# Patient Record
Sex: MALE | Race: WHITE | URBAN - METROPOLITAN AREA
[De-identification: names, ages, dates, MRNs, and addresses within clinical notes are randomized per-mention and may not be internally consistent; named-entity substitution may affect disease eponyms.]

---

## 2019-06-28 ENCOUNTER — APPOINTMENT (RX ONLY)
Dept: URBAN - METROPOLITAN AREA CLINIC 31 | Facility: CLINIC | Age: 19
Setting detail: DERMATOLOGY
End: 2019-06-28

## 2019-06-28 DIAGNOSIS — L72.0 EPIDERMAL CYST: ICD-10-CM

## 2019-06-28 PROCEDURE — ? COUNSELING

## 2019-06-28 PROCEDURE — ? DEFER

## 2019-06-28 PROCEDURE — ? ADDITIONAL NOTES

## 2019-06-28 PROCEDURE — 99202 OFFICE O/P NEW SF 15 MIN: CPT

## 2019-06-28 PROCEDURE — ? PRESCRIPTION

## 2019-06-28 RX ORDER — DOXYCYCLINE HYCLATE 100 MG/1
TABLET, COATED ORAL
Qty: 20 | Refills: 0 | Status: ERX | COMMUNITY
Start: 2019-06-28

## 2019-06-28 RX ADMIN — DOXYCYCLINE HYCLATE: 100 TABLET, COATED ORAL at 16:11

## 2019-06-28 ASSESSMENT — LOCATION ZONE DERM: LOCATION ZONE: FACE

## 2019-06-28 ASSESSMENT — LOCATION DETAILED DESCRIPTION DERM: LOCATION DETAILED: LEFT CENTRAL MALAR CHEEK

## 2019-06-28 ASSESSMENT — LOCATION SIMPLE DESCRIPTION DERM: LOCATION SIMPLE: LEFT CHEEK

## 2019-06-28 NOTE — PROCEDURE: DEFER
Introduction Text (Please End With A Colon): The following procedure was deferred:
Instructions (Optional): Declines ILK
Detail Level: Simple

## 2019-06-28 NOTE — HPI: SKIN LESION
What Type Of Note Output Would You Prefer (Optional)?: Bullet Format
Has Your Skin Lesion Been Treated?: not been treated
Is This A New Presentation, Or A Follow-Up?: Skin Lesion
Which Family Member (Optional)?: Father
Which Family Member (Optional)?: Varies
Additional History: Pt admits to expressing some pus and material out of the lesion in the past

## 2019-11-14 ENCOUNTER — APPOINTMENT (RX ONLY)
Dept: URBAN - METROPOLITAN AREA CLINIC 26 | Facility: CLINIC | Age: 19
Setting detail: DERMATOLOGY
End: 2019-11-14

## 2019-11-14 DIAGNOSIS — L72.0 EPIDERMAL CYST: ICD-10-CM

## 2019-11-14 PROCEDURE — ? COUNSELING

## 2019-11-14 PROCEDURE — ? EXCISION

## 2019-11-14 PROCEDURE — 12052 INTMD RPR FACE/MM 2.6-5.0 CM: CPT | Mod: 59

## 2019-11-14 PROCEDURE — 11440 EXC FACE-MM B9+MARG 0.5 CM/<: CPT | Mod: 76

## 2019-11-14 PROCEDURE — 11440 EXC FACE-MM B9+MARG 0.5 CM/<: CPT

## 2019-11-14 ASSESSMENT — LOCATION DETAILED DESCRIPTION DERM
LOCATION DETAILED: LEFT SUPERIOR CENTRAL MALAR CHEEK
LOCATION DETAILED: LEFT CENTRAL MALAR CHEEK

## 2019-11-14 ASSESSMENT — LOCATION SIMPLE DESCRIPTION DERM: LOCATION SIMPLE: LEFT CHEEK

## 2019-11-14 ASSESSMENT — LOCATION ZONE DERM: LOCATION ZONE: FACE

## 2019-11-14 NOTE — PROCEDURE: MIPS QUALITY
Detail Level: Detailed
Quality 431: Preventive Care And Screening: Unhealthy Alcohol Use - Screening: Patient screened for unhealthy alcohol use using a single question and scores 2 or greater episodes per year and brief intervention occurred
Quality 226: Preventive Care And Screening: Tobacco Use: Screening And Cessation Intervention: Patient screened for tobacco use and is an ex/non-smoker

## 2019-11-21 ENCOUNTER — APPOINTMENT (RX ONLY)
Dept: URBAN - METROPOLITAN AREA CLINIC 26 | Facility: CLINIC | Age: 19
Setting detail: DERMATOLOGY
End: 2019-11-21

## 2019-11-21 DIAGNOSIS — Z48.817 ENCOUNTER FOR SURGICAL AFTERCARE FOLLOWING SURGERY ON THE SKIN AND SUBCUTANEOUS TISSUE: ICD-10-CM

## 2019-11-21 PROCEDURE — ? PHOTO-DOCUMENTATION

## 2019-11-21 PROCEDURE — 99024 POSTOP FOLLOW-UP VISIT: CPT

## 2019-11-21 PROCEDURE — ? POST-OP WOUND CHECK

## 2019-11-21 PROCEDURE — ? SUTURE REMOVAL (GLOBAL PERIOD)

## 2019-11-21 ASSESSMENT — LOCATION ZONE DERM: LOCATION ZONE: FACE

## 2019-11-21 ASSESSMENT — LOCATION SIMPLE DESCRIPTION DERM: LOCATION SIMPLE: LEFT CHEEK

## 2019-11-21 ASSESSMENT — LOCATION DETAILED DESCRIPTION DERM
LOCATION DETAILED: LEFT SUPERIOR MEDIAL MALAR CHEEK
LOCATION DETAILED: LEFT CENTRAL MALAR CHEEK

## 2019-11-21 NOTE — PROCEDURE: POST-OP WOUND CHECK
Add 53420 Cpt? (Important Note: In 2017 The Use Of 10495 Is Being Tracked By Cms To Determine Future Global Period Reimbursement For Global Periods): yes
Detail Level: Detailed

## 2019-11-21 NOTE — PROCEDURE: SUTURE REMOVAL (GLOBAL PERIOD)
Detail Level: Detailed
Add 70836 Cpt? (Important Note: In 2017 The Use Of 99840 Is Being Tracked By Cms To Determine Future Global Period Reimbursement For Global Periods): yes

## 2023-11-27 ENCOUNTER — HOSPITAL ENCOUNTER (EMERGENCY)
Facility: HOSPITAL | Age: 23
Discharge: HOME | End: 2023-11-27
Attending: EMERGENCY MEDICINE | Admitting: EMERGENCY MEDICINE
Payer: COMMERCIAL

## 2023-11-27 ENCOUNTER — APPOINTMENT (EMERGENCY)
Dept: CT IMAGING | Facility: HOSPITAL | Age: 23
End: 2023-11-27
Payer: COMMERCIAL

## 2023-11-27 VITALS
HEIGHT: 69 IN | WEIGHT: 200 LBS | TEMPERATURE: 97.8 F | DIASTOLIC BLOOD PRESSURE: 88 MMHG | SYSTOLIC BLOOD PRESSURE: 152 MMHG | RESPIRATION RATE: 18 BRPM | OXYGEN SATURATION: 100 % | HEART RATE: 73 BPM | BODY MASS INDEX: 29.62 KG/M2

## 2023-11-27 DIAGNOSIS — R10.9 ABDOMINAL PAIN: Primary | ICD-10-CM

## 2023-11-27 DIAGNOSIS — R59.9 LYMPH NODES ENLARGED: ICD-10-CM

## 2023-11-27 DIAGNOSIS — S39.012A STRAIN OF LUMBAR REGION, INITIAL ENCOUNTER: ICD-10-CM

## 2023-11-27 LAB
ALBUMIN SERPL BCP-MCNC: 4.4 G/DL (ref 3.5–5)
ALP SERPL-CCNC: 83 U/L (ref 34–104)
ALT SERPL W P-5'-P-CCNC: 6 U/L (ref 7–52)
ANION GAP SERPL CALCULATED.3IONS-SCNC: 7 MMOL/L
AST SERPL W P-5'-P-CCNC: 8 U/L (ref 13–39)
BASOPHILS # BLD AUTO: 0.07 THOUSANDS/ÂΜL (ref 0–0.1)
BASOPHILS NFR BLD AUTO: 1 % (ref 0–1)
BILIRUB SERPL-MCNC: 0.32 MG/DL (ref 0.2–1)
BUN SERPL-MCNC: 8 MG/DL (ref 5–25)
CALCIUM SERPL-MCNC: 9.4 MG/DL (ref 8.4–10.2)
CHLORIDE SERPL-SCNC: 104 MMOL/L (ref 96–108)
CO2 SERPL-SCNC: 25 MMOL/L (ref 21–32)
CREAT SERPL-MCNC: 0.94 MG/DL (ref 0.6–1.3)
CRP SERPL QL: 1.7 MG/L
EOSINOPHIL # BLD AUTO: 0.06 THOUSAND/ÂΜL (ref 0–0.61)
EOSINOPHIL NFR BLD AUTO: 1 % (ref 0–6)
ERYTHROCYTE [DISTWIDTH] IN BLOOD BY AUTOMATED COUNT: 12.6 % (ref 11.6–15.1)
GFR SERPL CREATININE-BSD FRML MDRD: 112 ML/MIN/1.73SQ M
GLUCOSE SERPL-MCNC: 119 MG/DL (ref 65–140)
HCT VFR BLD AUTO: 45.7 % (ref 36.5–49.3)
HGB BLD-MCNC: 14.7 G/DL (ref 12–17)
IMM GRANULOCYTES # BLD AUTO: 0.07 THOUSAND/UL (ref 0–0.2)
IMM GRANULOCYTES NFR BLD AUTO: 1 % (ref 0–2)
LIPASE SERPL-CCNC: 26 U/L (ref 11–82)
LYMPHOCYTES # BLD AUTO: 1.51 THOUSANDS/ÂΜL (ref 0.6–4.47)
LYMPHOCYTES NFR BLD AUTO: 14 % (ref 14–44)
MCH RBC QN AUTO: 29.2 PG (ref 26.8–34.3)
MCHC RBC AUTO-ENTMCNC: 32.2 G/DL (ref 31.4–37.4)
MCV RBC AUTO: 91 FL (ref 82–98)
MONOCYTES # BLD AUTO: 0.38 THOUSAND/ÂΜL (ref 0.17–1.22)
MONOCYTES NFR BLD AUTO: 4 % (ref 4–12)
NEUTROPHILS # BLD AUTO: 8.5 THOUSANDS/ÂΜL (ref 1.85–7.62)
NEUTS SEG NFR BLD AUTO: 79 % (ref 43–75)
NRBC BLD AUTO-RTO: 0 /100 WBCS
PLATELET # BLD AUTO: 349 THOUSANDS/UL (ref 149–390)
PMV BLD AUTO: 8.8 FL (ref 8.9–12.7)
POTASSIUM SERPL-SCNC: 4.3 MMOL/L (ref 3.5–5.3)
PROT SERPL-MCNC: 7.8 G/DL (ref 6.4–8.4)
RBC # BLD AUTO: 5.03 MILLION/UL (ref 3.88–5.62)
SODIUM SERPL-SCNC: 136 MMOL/L (ref 135–147)
WBC # BLD AUTO: 10.59 THOUSAND/UL (ref 4.31–10.16)

## 2023-11-27 PROCEDURE — 74177 CT ABD & PELVIS W/CONTRAST: CPT

## 2023-11-27 PROCEDURE — 96374 THER/PROPH/DIAG INJ IV PUSH: CPT

## 2023-11-27 PROCEDURE — G1004 CDSM NDSC: HCPCS

## 2023-11-27 PROCEDURE — 85025 COMPLETE CBC W/AUTO DIFF WBC: CPT

## 2023-11-27 PROCEDURE — 99284 EMERGENCY DEPT VISIT MOD MDM: CPT

## 2023-11-27 PROCEDURE — 99285 EMERGENCY DEPT VISIT HI MDM: CPT

## 2023-11-27 PROCEDURE — 80053 COMPREHEN METABOLIC PANEL: CPT

## 2023-11-27 PROCEDURE — 83690 ASSAY OF LIPASE: CPT

## 2023-11-27 PROCEDURE — 36415 COLL VENOUS BLD VENIPUNCTURE: CPT

## 2023-11-27 PROCEDURE — 87493 C DIFF AMPLIFIED PROBE: CPT

## 2023-11-27 PROCEDURE — 86140 C-REACTIVE PROTEIN: CPT

## 2023-11-27 RX ORDER — KETOROLAC TROMETHAMINE 30 MG/ML
15 INJECTION, SOLUTION INTRAMUSCULAR; INTRAVENOUS ONCE
Status: COMPLETED | OUTPATIENT
Start: 2023-11-27 | End: 2023-11-27

## 2023-11-27 RX ADMIN — IOHEXOL 80 ML: 350 INJECTION, SOLUTION INTRAVENOUS at 09:56

## 2023-11-27 RX ADMIN — KETOROLAC TROMETHAMINE 15 MG: 30 INJECTION, SOLUTION INTRAMUSCULAR at 09:25

## 2023-11-27 NOTE — DISCHARGE INSTRUCTIONS
Take ibuprofen or tylenol every 4-6 hours for pain. Can alternate them every 3 hours as needed   Rest, use heating pads, ice on your back.  Avoid heavy lifting for atleast a week  Lidocaine patches for 12 hours on and 12 hours off   Follow up with your PCP if symptoms persist despite these treatments  Return to the ER if you develop intense back pain that is worse or different than before, cant feel or move your legs, numbness, weakness, develop pelvic numbness, bowel or bladder incontinence, fevers, chest pain, shortness of breath

## 2023-11-27 NOTE — ED PROVIDER NOTES
History  Chief Complaint   Patient presents with    Abdominal Pain     Pt reports lower abdominal pain that started yesterday, woke up at 0200 from sleep from pain. Denies n/v/d. States that he has a "j pouch"      Linus Shaffer is a 21 yo male coming to the ER with a cc of lower abdominal pain and a PMHx of a J-pouch procedure in 2011. He states the pain began around 11am yesterday with am mild ache, then was woken up at 2am this morning with worsened pain that radiates to his low back. He has tried Ibuprofen at 3 am without any relief. Sitting up straight helps reduce some pain and laying down or standing makes the pain worse. He denies any previous episodes. The pain is described as achy and originates from his low abdomen. The pain is constant. He admits since yesterday his stools have been all mucus. He denies any n/v/d/c, changes with eating or drinking, hematochezia, hematuria, pain with urination, difficulty urinating, testicular pain, testicular swelling, chest pain, shortness of breath, or fevers. He does admit he lifted a very heavy cough this week. He denies any sick contacts, recent travel, recent hospitalizations or surgeries. Patient admit that he had his j-pouch surgery done at [facility] for multiple polyps in his colon. His family has a long standing history of colon cancer - every male who did not get the surgery has had colon cancer. He admits that he has not been following up with [facility] as he should (every 2 years) and hasn't seen a doctor in 5 years. History provided by:  Patient   used: No    Abdominal Pain  Pain location:  LLQ, RLQ, L flank and R flank  Pain quality: aching    Pain radiates to:  Back  Onset quality:  Gradual  Duration:  21 hours  Timing:  Constant  Progression:  Worsening  Associated symptoms: no chest pain, no constipation, no diarrhea, no fever, no hematuria, no nausea, no shortness of breath and no vomiting        None       History reviewed.  No pertinent past medical history. Past Surgical History:   Procedure Laterality Date    BOWEL RESECTION      2011       History reviewed. No pertinent family history. I have reviewed and agree with the history as documented. E-Cigarette/Vaping     E-Cigarette/Vaping Substances     Social History     Tobacco Use    Smoking status: Never    Smokeless tobacco: Never   Substance Use Topics    Alcohol use: Never    Drug use: Never       Review of Systems   Constitutional:  Negative for fever. Respiratory:  Negative for chest tightness and shortness of breath. Cardiovascular:  Negative for chest pain. Gastrointestinal:  Positive for abdominal pain and blood in stool (chronic). Negative for constipation, diarrhea, nausea and vomiting. Genitourinary:  Negative for difficulty urinating and hematuria. Musculoskeletal:  Positive for back pain. Physical Exam  Physical Exam  Vitals and nursing note reviewed. Constitutional:       General: He is awake. Appearance: Normal appearance. He is well-developed. HENT:      Head: Normocephalic and atraumatic. Right Ear: External ear normal.      Left Ear: External ear normal.      Nose: Nose normal.   Eyes:      General: No scleral icterus. Extraocular Movements: Extraocular movements intact. Cardiovascular:      Rate and Rhythm: Normal rate and regular rhythm. Heart sounds: Normal heart sounds, S1 normal and S2 normal. No murmur heard. No gallop. Pulmonary:      Effort: Pulmonary effort is normal.      Breath sounds: Normal breath sounds. No wheezing, rhonchi or rales. Abdominal:      General: Abdomen is flat. A surgical scar is present. Bowel sounds are normal.      Palpations: Abdomen is soft. Tenderness: There is abdominal tenderness in the right lower quadrant and left lower quadrant. There is no right CVA tenderness or left CVA tenderness.    Genitourinary:     Comments: No saddle anesthesia  Musculoskeletal:         General: Normal range of motion. Cervical back: Normal range of motion. Comments: Lumbar paraspinal muscles noted in spasm - no midline spinal tenderness. No swelling, bruising, redness, step offs or deformities noted. Skin:     General: Skin is warm and dry. Neurological:      General: No focal deficit present. Mental Status: He is alert. Psychiatric:         Attention and Perception: Attention and perception normal.         Mood and Affect: Mood normal.         Behavior: Behavior normal. Behavior is cooperative.          Vital Signs  ED Triage Vitals [11/27/23 0813]   Temperature Pulse Respirations Blood Pressure SpO2   97.8 °F (36.6 °C) 73 18 152/88 100 %      Temp Source Heart Rate Source Patient Position - Orthostatic VS BP Location FiO2 (%)   Temporal Monitor Sitting Left arm --      Pain Score       6           Vitals:    11/27/23 0813   BP: 152/88   Pulse: 73   Patient Position - Orthostatic VS: Sitting         Visual Acuity      ED Medications  Medications   ketorolac (TORADOL) injection 15 mg (15 mg Intravenous Given 11/27/23 0925)   iohexol (OMNIPAQUE) 350 MG/ML injection (MULTI-DOSE) 80 mL (80 mL Intravenous Given 11/27/23 0956)       Diagnostic Studies  Results Reviewed       Procedure Component Value Units Date/Time    Stool Enteric Bacterial Panel by PCR [[id number]] Updated: 11/27/23 1011    Lab Status: No result Specimen: Stool from Per Rectum     Comprehensive metabolic panel [[id number]]  (Abnormal) Collected: 11/27/23 0920    Lab Status: Final result Specimen: Blood from Arm, Right Updated: 11/27/23 0947     Sodium 136 mmol/L      Potassium 4.3 mmol/L      Chloride 104 mmol/L      CO2 25 mmol/L      ANION GAP 7 mmol/L      BUN 8 mg/dL      Creatinine 0.94 mg/dL      Glucose 119 mg/dL      Calcium 9.4 mg/dL      AST 8 U/L      ALT 6 U/L      Alkaline Phosphatase 83 U/L      Total Protein 7.8 g/dL      Albumin 4.4 g/dL      Total Bilirubin 0.32 mg/dL      eGFR 112 ml/min/1.73sq m     Narrative: National Kidney Disease Foundation guidelines for Chronic Kidney Disease (CKD):     Stage 1 with normal or high GFR (GFR > 90 mL/min/1.73 square meters)    Stage 2 Mild CKD (GFR = 60-89 mL/min/1.73 square meters)    Stage 3A Moderate CKD (GFR = 45-59 mL/min/1.73 square meters)    Stage 3B Moderate CKD (GFR = 30-44 mL/min/1.73 square meters)    Stage 4 Severe CKD (GFR = 15-29 mL/min/1.73 square meters)    Stage 5 End Stage CKD (GFR <15 mL/min/1.73 square meters)  Note: GFR calculation is accurate only with a steady state creatinine    Lipase [[id number]]  (Normal) Collected: 11/27/23 0920    Lab Status: Final result Specimen: Blood from Arm, Right Updated: 11/27/23 0947     Lipase 26 u/L     C-reactive protein [[id number]]  (Normal) Collected: 11/27/23 0920    Lab Status: Final result Specimen: Blood from Arm, Right Updated: 11/27/23 0946     CRP 1.7 mg/L     CBC and differential [[id number]]  (Abnormal) Collected: 11/27/23 0920    Lab Status: Final result Specimen: Blood from Arm, Right Updated: 11/27/23 0929     WBC 10.59 Thousand/uL      RBC 5.03 Million/uL      Hemoglobin 14.7 g/dL      Hematocrit 45.7 %      MCV 91 fL      MCH 29.2 pg      MCHC 32.2 g/dL      RDW 12.6 %      MPV 8.8 fL      Platelets 943 Thousands/uL      nRBC 0 /100 WBCs      Neutrophils Relative 79 %      Immat GRANS % 1 %      Lymphocytes Relative 14 %      Monocytes Relative 4 %      Eosinophils Relative 1 %      Basophils Relative 1 %      Neutrophils Absolute 8.50 Thousands/µL      Immature Grans Absolute 0.07 Thousand/uL      Lymphocytes Absolute 1.51 Thousands/µL      Monocytes Absolute 0.38 Thousand/µL      Eosinophils Absolute 0.06 Thousand/µL      Basophils Absolute 0.07 Thousands/µL     Clostridium difficile toxin by PCR with EIA [[id number]] Collected: 11/27/23 0924    Lab Status:  In process Specimen: Stool from Per Rectum Updated: 11/27/23 0928    UA w Reflex to Microscopic w Reflex to Culture [[id number]]     Lab Status: No result Specimen: Urine                    CT abdomen pelvis with contrast   Final Result by Neo Isaacs MD (11/27 1030)      No acute intra-abdominal pathology. Mildly prominent lymph nodes in the right lower quadrant and pelvis, nonspecific. Follow-up is advised. Workstation performed: [id number]                    Procedures  Procedures         ED Course                               SBIRT 22yo+      Flowsheet Row Most Recent Value   Initial Alcohol Screen: US AUDIT-C     1. How often do you have a drink containing alcohol? 0 Filed at: 11/27/2023 0815   2. How many drinks containing alcohol do you have on a typical day you are drinking? 0 Filed at: 11/27/2023 0815   3a. Male UNDER 65: How often do you have five or more drinks on one occasion? 0 Filed at: 11/27/2023 0815   3b. FEMALE Any Age, or MALE 65+: How often do you have 4 or more drinks on one occassion? 0 Filed at: 11/27/2023 0815   Audit-C Score 0 Filed at: 11/27/2023 7130   CARLOS: How many times in the past year have you. .. Used an illegal drug or used a prescription medication for non-medical reasons? Never Filed at: 11/27/2023 0815                      Medical Decision Making  21 yo male with a cc of lower abdominal pain x 21 hours  Differential diagnosis including but not limited to: appendicitis, SBO, post op complication, ureterolithiasis, obstructing ureterolithiasis, pyelonephritis, abdominal abscess, malignancy, muscular strain   Assessment: abdominal pain, enlarged lymph nodes, strain of lumbar region  Plan:  labs unremarkable. No acute pathology on CT. Incidentally found enlarged lymph nodes in RLQ/pelvis area. With patients history of j-pouch and has not followed up, did send referral to colorectal surgery. Also gave patient infolink number to establish care with a PCP. Symptoms likely secondary to muscular strain and radiating to abdomen. Recommended supportive care.  He  was given strict return to ER precautions both verbally and in discharge papers. Patient verbalized understanding and agrees with plan. Amount and/or Complexity of Data Reviewed  External Data Reviewed: notes. Details: ER visit from 1/13/2014  Labs: ordered. Radiology: ordered. Risk  Prescription drug management. Disposition  Final diagnoses:   Abdominal pain   Lymph nodes enlarged - abdomen, pelvis   Strain of lumbar region, initial encounter     Time reflects when diagnosis was documented in both MDM as applicable and the Disposition within this note       Time User Action Codes Description Comment    11/27/2023 10:57 AM Vallorie Brick Add [R10.9] Abdominal pain     11/27/2023 10:57 AM Vallorie Brick Add [R59.9] Lymph nodes enlarged     11/27/2023 10:57 AM Vallorie Brick Modify [R59.9] Lymph nodes enlarged abdomen, pelvis    11/27/2023 10:57 AM Vallorie Brick Add [S39.012A] Strain of lumbar region, initial encounter           ED Disposition       ED Disposition   Discharge    Condition   Stable    Date/Time   Mon Nov 27, 2023 [place] discharge to home/self care. Follow-up Information       Follow up With Specialties Details Why Contact Info Additional Information    [facility] Schedule an appointment as soon as possible for a visit   [address] [facility] [address]  [address]  Emergency Department Emergency Medicine Go to  If symptoms worsen [address] [facility] [address]  [address] [facility] Emergency Department, [address] [facility] [place], [address] [facility]    [facility]    [phone]               There are no discharge medications for this patient.           PDMP Review       None            ED Provider  Electronically Signed by             Marco A Ribera PA-C  11/27/23 0150

## 2023-11-28 LAB
C DIFF TOX A+B STL QL IA: NEGATIVE
C DIFF TOX B TCDB STL QL NAA+PROBE: NEGATIVE
C DIFF TOX GENS STL QL NAA+PROBE: NEGATIVE

## 2024-06-19 ENCOUNTER — HOSPITAL ENCOUNTER (EMERGENCY)
Facility: HOSPITAL | Age: 24
Discharge: HOME | End: 2024-06-20
Attending: EMERGENCY MEDICINE
Payer: COMMERCIAL

## 2024-06-19 ENCOUNTER — OFFICE VISIT (OUTPATIENT)
Dept: URGENT CARE | Facility: CLINIC | Age: 24
End: 2024-06-19
Payer: COMMERCIAL

## 2024-06-19 ENCOUNTER — APPOINTMENT (EMERGENCY)
Dept: CT IMAGING | Facility: HOSPITAL | Age: 24
End: 2024-06-19
Payer: COMMERCIAL

## 2024-06-19 VITALS
RESPIRATION RATE: 18 BRPM | HEART RATE: 55 BPM | TEMPERATURE: 97.5 F | DIASTOLIC BLOOD PRESSURE: 76 MMHG | OXYGEN SATURATION: 100 % | SYSTOLIC BLOOD PRESSURE: 129 MMHG

## 2024-06-19 VITALS
SYSTOLIC BLOOD PRESSURE: 131 MMHG | RESPIRATION RATE: 18 BRPM | OXYGEN SATURATION: 96 % | DIASTOLIC BLOOD PRESSURE: 74 MMHG | TEMPERATURE: 99 F | HEART RATE: 64 BPM

## 2024-06-19 DIAGNOSIS — R20.0 LEG NUMBNESS: Primary | ICD-10-CM

## 2024-06-19 DIAGNOSIS — M54.6 ACUTE BILATERAL THORACIC BACK PAIN: ICD-10-CM

## 2024-06-19 DIAGNOSIS — M54.6 ACUTE BILATERAL THORACIC BACK PAIN: Primary | ICD-10-CM

## 2024-06-19 LAB
ALBUMIN SERPL BCP-MCNC: 4.3 G/DL (ref 3.5–5)
ALP SERPL-CCNC: 70 U/L (ref 34–104)
ALT SERPL W P-5'-P-CCNC: 7 U/L (ref 7–52)
ANION GAP SERPL CALCULATED.3IONS-SCNC: 5 MMOL/L (ref 4–13)
AST SERPL W P-5'-P-CCNC: 9 U/L (ref 13–39)
BASOPHILS # BLD AUTO: 0.12 THOUSANDS/ÂΜL (ref 0–0.1)
BASOPHILS NFR BLD AUTO: 1 % (ref 0–1)
BILIRUB SERPL-MCNC: 0.22 MG/DL (ref 0.2–1)
BUN SERPL-MCNC: 15 MG/DL (ref 5–25)
CALCIUM SERPL-MCNC: 9.2 MG/DL (ref 8.4–10.2)
CHLORIDE SERPL-SCNC: 107 MMOL/L (ref 96–108)
CO2 SERPL-SCNC: 27 MMOL/L (ref 21–32)
CREAT SERPL-MCNC: 1.23 MG/DL (ref 0.6–1.3)
D DIMER PPP FEU-MCNC: <0.27 UG/ML FEU
EOSINOPHIL # BLD AUTO: 0.28 THOUSAND/ÂΜL (ref 0–0.61)
EOSINOPHIL NFR BLD AUTO: 3 % (ref 0–6)
ERYTHROCYTE [DISTWIDTH] IN BLOOD BY AUTOMATED COUNT: 12.3 % (ref 11.6–15.1)
GFR SERPL CREATININE-BSD FRML MDRD: 80 ML/MIN/1.73SQ M
GLUCOSE SERPL-MCNC: 94 MG/DL (ref 65–140)
HCT VFR BLD AUTO: 42.5 % (ref 36.5–49.3)
HGB BLD-MCNC: 12.9 G/DL (ref 12–17)
IMM GRANULOCYTES # BLD AUTO: 0.04 THOUSAND/UL (ref 0–0.2)
IMM GRANULOCYTES NFR BLD AUTO: 0 % (ref 0–2)
LYMPHOCYTES # BLD AUTO: 3.88 THOUSANDS/ÂΜL (ref 0.6–4.47)
LYMPHOCYTES NFR BLD AUTO: 42 % (ref 14–44)
MCH RBC QN AUTO: 27.3 PG (ref 26.8–34.3)
MCHC RBC AUTO-ENTMCNC: 30.4 G/DL (ref 31.4–37.4)
MCV RBC AUTO: 90 FL (ref 82–98)
MONOCYTES # BLD AUTO: 0.74 THOUSAND/ÂΜL (ref 0.17–1.22)
MONOCYTES NFR BLD AUTO: 8 % (ref 4–12)
NEUTROPHILS # BLD AUTO: 4.27 THOUSANDS/ÂΜL (ref 1.85–7.62)
NEUTS SEG NFR BLD AUTO: 46 % (ref 43–75)
NRBC BLD AUTO-RTO: 0 /100 WBCS
PLATELET # BLD AUTO: 296 THOUSANDS/UL (ref 149–390)
PMV BLD AUTO: 8.8 FL (ref 8.9–12.7)
POTASSIUM SERPL-SCNC: 4 MMOL/L (ref 3.5–5.3)
PROT SERPL-MCNC: 7.1 G/DL (ref 6.4–8.4)
RBC # BLD AUTO: 4.73 MILLION/UL (ref 3.88–5.62)
SODIUM SERPL-SCNC: 139 MMOL/L (ref 135–147)
WBC # BLD AUTO: 9.33 THOUSAND/UL (ref 4.31–10.16)

## 2024-06-19 PROCEDURE — 99213 OFFICE O/P EST LOW 20 MIN: CPT

## 2024-06-19 PROCEDURE — 36415 COLL VENOUS BLD VENIPUNCTURE: CPT

## 2024-06-19 PROCEDURE — 74174 CTA ABD&PLVS W/CONTRAST: CPT

## 2024-06-19 PROCEDURE — 71275 CT ANGIOGRAPHY CHEST: CPT

## 2024-06-19 PROCEDURE — 85025 COMPLETE CBC W/AUTO DIFF WBC: CPT

## 2024-06-19 PROCEDURE — 85379 FIBRIN DEGRADATION QUANT: CPT

## 2024-06-19 PROCEDURE — 99285 EMERGENCY DEPT VISIT HI MDM: CPT

## 2024-06-19 PROCEDURE — 99284 EMERGENCY DEPT VISIT MOD MDM: CPT

## 2024-06-19 PROCEDURE — 93005 ELECTROCARDIOGRAM TRACING: CPT

## 2024-06-19 PROCEDURE — 80053 COMPREHEN METABOLIC PANEL: CPT

## 2024-06-19 PROCEDURE — 96374 THER/PROPH/DIAG INJ IV PUSH: CPT

## 2024-06-19 RX ORDER — LIDOCAINE 50 MG/G
1 PATCH TOPICAL ONCE
Status: DISCONTINUED | OUTPATIENT
Start: 2024-06-19 | End: 2024-06-20 | Stop reason: HOSPADM

## 2024-06-19 RX ORDER — KETOROLAC TROMETHAMINE 30 MG/ML
15 INJECTION, SOLUTION INTRAMUSCULAR; INTRAVENOUS ONCE
Status: COMPLETED | OUTPATIENT
Start: 2024-06-19 | End: 2024-06-19

## 2024-06-19 RX ADMIN — LIDOCAINE 5% 1 PATCH: 700 PATCH TOPICAL at 23:41

## 2024-06-19 RX ADMIN — IOHEXOL 100 ML: 350 INJECTION, SOLUTION INTRAVENOUS at 22:03

## 2024-06-19 RX ADMIN — KETOROLAC TROMETHAMINE 15 MG: 30 INJECTION, SOLUTION INTRAMUSCULAR; INTRAVENOUS at 23:41

## 2024-06-19 NOTE — PROGRESS NOTES
"  [facility]        NAME: Sekou Sigala is a 24 y.o. male  : 2000    MRN: [id number]  DATE: 2024  TIME: 5:31 PM    Assessment and Plan   Acute bilateral thoracic back pain [M54.6]  1. Acute bilateral thoracic back pain  Transfer to other facility      2. Leg numbness  Transfer to other facility            Patient Instructions       Follow up with PCP in 3-5 days.  Proceed to  ER if symptoms worsen.    If tests have been performed at [facility], our office will contact you with results if changes need to be made to the care plan discussed with you at the visit.  You can review your full results on [facility].    Chief Complaint     Chief Complaint   Patient presents with    Back Pain     Patient has mid back pain that started yesterday while working, states his back \"felt like glass\". States he took a \"bunch of ibuprofen\" and has mild improvement          History of Present Illness       This is a 24-year-old male who presents today with mid back pain that radiates down to his flank on both sides.  He states he always has lower back pain, but this pain is in his mid back.  Patient states it started yesterday at around 11 AM while he was mowing the lawn.  He states he felt like he could not move because he thought his back would break like glass.  He has been taking Motrin with some relief he is able to move today.  He states that while he was weed whacking today he started with a tingling sensation in his right leg that went numb and he fell.  Patient has a significant medical history of colon cancer and has a J-pouch. denies any numbness or tingling in his lower extremities now patient is able to move his upper body and able to walk without difficulty we did discuss that his leg going numb and falling needs to be further evaluated in the emergency room.         Review of Systems   Review of Systems   Constitutional: Negative.    HENT: Negative.     Eyes: Negative.    Respiratory: " Negative.     Cardiovascular: Negative.    Gastrointestinal: Negative.    Genitourinary: Negative.    Musculoskeletal:  Positive for back pain (bilateal mid back pain). Negative for gait problem, joint swelling and myalgias.   Neurological:  Positive for numbness (R lower leg went numb earlier today, but none currently).         Current Medications     No current outpatient medications on file.    Current Allergies     Allergies as of 06/19/2024 - Reviewed 06/19/2024   Allergen Reaction Noted    Omnicef [cefdinir] Hives 11/27/2023            The following portions of the patient's history were reviewed and updated as appropriate: allergies, current medications, past family history, past medical history, past social history, past surgical history and problem list.     History reviewed. No pertinent past medical history.    Past Surgical History:   Procedure Laterality Date    BOWEL RESECTION      2011       History reviewed. No pertinent family history.      Medications have been verified.        Objective   /74   Pulse 64   Temp 99 °F (37.2 °C)   Resp 18   SpO2 96%   No LMP for male patient.       Physical Exam     Physical Exam  Constitutional:       Appearance: Normal appearance.   HENT:      Head: Normocephalic and atraumatic.      Right Ear: Tympanic membrane, ear canal and external ear normal.      Left Ear: Tympanic membrane, ear canal and external ear normal.      Nose: Nose normal.      Mouth/Throat:      Mouth: Mucous membranes are moist.   Eyes:      Conjunctiva/sclera: Conjunctivae normal.      Pupils: Pupils are equal, round, and reactive to light.   Cardiovascular:      Rate and Rhythm: Normal rate and regular rhythm.      Pulses: Normal pulses.      Heart sounds: Normal heart sounds.   Pulmonary:      Effort: Pulmonary effort is normal.      Breath sounds: Normal breath sounds.   Abdominal:      General: Abdomen is flat. Bowel sounds are normal.   Musculoskeletal:         General: No  swelling, tenderness, deformity or signs of injury.   Skin:     General: Skin is warm and dry.      Capillary Refill: Capillary refill takes less than 2 seconds.   Neurological:      General: No focal deficit present.      Mental Status: He is alert and oriented to person, place, and time.   Psychiatric:         Mood and Affect: Mood normal.         Thought Content: Thought content normal.         Judgment: Judgment normal.

## 2024-06-20 ENCOUNTER — TELEPHONE (OUTPATIENT)
Dept: PHYSICAL THERAPY | Facility: OTHER | Age: 24
End: 2024-06-20

## 2024-06-20 LAB
ATRIAL RATE: 53 BPM
P AXIS: 44 DEGREES
PR INTERVAL: 176 MS
QRS AXIS: 85 DEGREES
QRSD INTERVAL: 94 MS
QT INTERVAL: 400 MS
QTC INTERVAL: 375 MS
T WAVE AXIS: 57 DEGREES
VENTRICULAR RATE: 53 BPM

## 2024-06-20 PROCEDURE — 93010 ELECTROCARDIOGRAM REPORT: CPT | Performed by: INTERNAL MEDICINE

## 2024-06-20 NOTE — TELEPHONE ENCOUNTER
Nurse reached out to discuss recent referral entered for  [facility] program.    This RN provided him with a brief overview of the triage, referral and evaluation process.  Patient declined to participate in the program at this time d/t inability to answer questions now. Patient stated he was interested and would CB when he is able to discuss further.  Nurse agreed and respected his decision.    Nurse confirmed patient has [facility] contact information and encouraged to call program back when able. Patient agreed and very appreciative of call and discussion.    Nurse wished him well and referral closed per protocol. Also informed him that the chart would be noted.

## 2024-06-20 NOTE — ED PROVIDER NOTES
"History  Chief Complaint   Patient presents with    Back Pain     Pt reports mid back pain that started at 11am yesterday. Reports pain intermittently radiates to his right leg. Had an \"episode\" of numbness from his mid right back to his right foot, UC sent him to er. Denies loss of bowel or bladder.      Patient is a 24-year-old male presenting to the emergency department for an evaluation of midthoracic back pain with associated transient weakness in the left leg that caused him to fall over earlier at work. Pain started at 11 PM last night at rest, describes it as excruciating, 10 out of 10 at its worst, and states he has never had back pain before. States he has been taking Aleve \"more than ever\", with some relief. Patient also reports that every man and his family has had a heart attack before the age of 25, however states this may be attributed to drug use. Patient denies any active chest pain, shortness of breath, abdominal pain, /GI changes, headache, weakness or numbness in his extremities.       Back Pain  Associated symptoms: numbness (Transient left leg, not currently)    Associated symptoms: no abdominal pain, no chest pain, no dysuria, no fever and no headaches        None       History reviewed. No pertinent past medical history.    Past Surgical History:   Procedure Laterality Date    BOWEL RESECTION      2011       History reviewed. No pertinent family history.  I have reviewed and agree with the history as documented.    E-Cigarette/Vaping     E-Cigarette/Vaping Substances     Social History     Tobacco Use    Smoking status: Never    Smokeless tobacco: Never   Substance Use Topics    Alcohol use: Never    Drug use: Never       Review of Systems   Constitutional:  Negative for activity change, chills and fever.   Respiratory:  Negative for cough and shortness of breath.    Cardiovascular:  Negative for chest pain and palpitations.   Gastrointestinal:  Negative for abdominal pain, constipation, " diarrhea, nausea and vomiting.   Genitourinary:  Negative for difficulty urinating and dysuria.   Musculoskeletal:  Positive for back pain. Negative for arthralgias, gait problem, neck pain and neck stiffness.   Skin:  Negative for color change, pallor, rash and wound.   Neurological:  Positive for numbness (Transient left leg, not currently). Negative for dizziness, seizures, syncope, light-headedness and headaches.   All other systems reviewed and are negative.      Physical Exam  Physical Exam  Vitals and nursing note reviewed.   Constitutional:       General: He is not in acute distress.     Appearance: Normal appearance. He is well-developed and normal weight. He is not ill-appearing or toxic-appearing.   HENT:      Head: Normocephalic and atraumatic.   Eyes:      Conjunctiva/sclera: Conjunctivae normal.   Cardiovascular:      Rate and Rhythm: Regular rhythm. Bradycardia present.   Pulmonary:      Effort: Pulmonary effort is normal. No respiratory distress.      Breath sounds: Normal breath sounds. No wheezing.   Chest:      Chest wall: No tenderness.   Abdominal:      General: Abdomen is flat. There is no distension.      Palpations: Abdomen is soft.      Tenderness: There is no abdominal tenderness.   Musculoskeletal:         General: No swelling, tenderness or deformity. Normal range of motion.      Cervical back: Normal range of motion and neck supple. No rigidity or tenderness.   Skin:     General: Skin is warm and dry.      Capillary Refill: Capillary refill takes less than 2 seconds.   Neurological:      General: No focal deficit present.      Mental Status: He is alert and oriented to person, place, and time. Mental status is at baseline.      Sensory: Sensation is intact. No sensory deficit.      Motor: No weakness.      Gait: Gait normal.      Comments: No visible muscle atrophy.  Normal muscle tone with no spasticity or rigidity.  Hip flexion, hip extension, knee flexion, knee extension, ankle  dorsiflexion, ankle plantarflexion all 5/5 bilaterally.  Light touch intact bilaterally in all dermatomes from L2-S2.  Normal gait.  DP and PT pulses 2+ bilaterally checked with Doppler.  Cap refill < 2 secs.   Psychiatric:         Mood and Affect: Mood normal.         Vital Signs  ED Triage Vitals   Temperature Pulse Respirations Blood Pressure SpO2   06/19/24 1741 06/19/24 1741 06/19/24 1741 06/19/24 1741 06/19/24 1741   97.5 °F (36.4 °C) 67 18 155/76 100 %      Temp Source Heart Rate Source Patient Position - Orthostatic VS BP Location FiO2 (%)   06/19/24 1741 06/19/24 1741 06/19/24 1741 06/19/24 1741 --   Temporal Monitor Sitting Right arm       Pain Score       06/19/24 2341       5           Vitals:    06/19/24 1741 06/19/24 2130   BP: 155/76 129/76   Pulse: 67 55   Patient Position - Orthostatic VS: Sitting          Visual Acuity      ED Medications  Medications   lidocaine (LIDODERM) 5 % patch 1 patch (1 patch Topical Medication Applied 6/19/24 2341)   iohexol (OMNIPAQUE) 350 MG/ML injection (SINGLE-DOSE) 100 mL (100 mL Intravenous Given 6/19/24 2203)   ketorolac (TORADOL) injection 15 mg (15 mg Intravenous Given 6/19/24 2341)       Diagnostic Studies  Results Reviewed       Procedure Component Value Units Date/Time    D-Dimer [[id number]]  (Normal) Collected: 06/19/24 2116    Lab Status: Final result Specimen: Blood from Arm, Right Updated: 06/19/24 2149     D-Dimer, Quant <0.27 ug/ml [facility]     Comprehensive metabolic panel [[id number]]  (Abnormal) Collected: 06/19/24 2116    Lab Status: Final result Specimen: Blood from Arm, Right Updated: 06/19/24 2141     Sodium 139 mmol/L      Potassium 4.0 mmol/L      Chloride 107 mmol/L      CO2 27 mmol/L      ANION GAP 5 mmol/L      BUN 15 mg/dL      Creatinine 1.23 mg/dL      Glucose 94 mg/dL      Calcium 9.2 mg/dL      AST 9 U/L      ALT 7 U/L      Alkaline Phosphatase 70 U/L      Total Protein 7.1 g/dL      Albumin 4.3 g/dL      Total Bilirubin 0.22 mg/dL      eGFR 80  ml/min/1.73sq m     Narrative:      [employer] guidelines for Chronic Kidney Disease (CKD):     Stage 1 with normal or high GFR (GFR > 90 mL/min/1.73 square meters)    Stage 2 Mild CKD (GFR = 60-89 mL/min/1.73 square meters)    Stage 3A Moderate CKD (GFR = 45-59 mL/min/1.73 square meters)    Stage 3B Moderate CKD (GFR = 30-44 mL/min/1.73 square meters)    Stage 4 Severe CKD (GFR = 15-29 mL/min/1.73 square meters)    Stage 5 End Stage CKD (GFR <15 mL/min/1.73 square meters)  Note: GFR calculation is accurate only with a steady state creatinine    CBC and differential [[id number]]  (Abnormal) Collected: 06/19/24 2116    Lab Status: Final result Specimen: Blood from Arm, Right Updated: 06/19/24 2124     WBC 9.33 Thousand/uL      RBC 4.73 Million/uL      Hemoglobin 12.9 g/dL      Hematocrit 42.5 %      MCV 90 fL      MCH 27.3 pg      MCHC 30.4 g/dL      RDW 12.3 %      MPV 8.8 fL      Platelets 296 Thousands/uL      nRBC 0 /100 WBCs      Segmented % 46 %      Immature Grans % 0 %      Lymphocytes % 42 %      Monocytes % 8 %      Eosinophils Relative 3 %      Basophils Relative 1 %      Absolute Neutrophils 4.27 Thousands/µL      Absolute Immature Grans 0.04 Thousand/uL      Absolute Lymphocytes 3.88 Thousands/µL      Absolute Monocytes 0.74 Thousand/µL      Eosinophils Absolute 0.28 Thousand/µL      Basophils Absolute 0.12 Thousands/µL                    CTA dissection protocol chest/abdomen/pelvis   Final Result by Jeremi Bridges MD (06/19 9502)      1.  No evidence for thoracoabdominal aortic dissection or aneurysm.   2.  No acute pulmonary embolism.   3.  No acute intrathoracic abnormalities.   4.  Postsurgical changes of prior colonic resection with J-pouch again noted. No evidence of bowel obstruction or inflammation. No free air, free fluid, or loculated fluid collections.  Several nonspecific mildly prominent pelvic mesenteric lymph nodes    again noted with the largest measuring 1.3 cm,  not significantly changed compared to the prior exam.      Workstation performed: [id number]         CT recon only thoracolumbar   Final Result by Jeremi Bridges MD (06/19 2306)      No acute thoracolumbar spine fracture or traumatic subluxation.               Workstation performed: [id number]                    Procedures  ECG 12 Lead Documentation Only    Date/Time: 6/20/2024 12:12 AM    Performed by: Latanya Russ PA-C  Authorized by: Latanya Russ PA-C    Indications / Diagnosis:  Back pain, h/o of MI prior to age 23  ECG reviewed by me, the ED Provider: yes (Dr. Donahue)    Patient location:  ED  Previous ECG:     Previous ECG:  Unavailable  Interpretation:     Interpretation: normal    Rate:     ECG rate:  53    ECG rate assessment: bradycardic    Rhythm:     Rhythm: sinus bradycardia    Ectopy:     Ectopy: PAC    QRS:     QRS axis:  Normal    QRS intervals:  Normal  Conduction:     Conduction: normal    ST segments:     ST segments:  Normal  T waves:     T waves: normal             ED Course                               SBIRT 22yo+      Flowsheet Row Most Recent Value   Initial Alcohol Screen: US AUDIT-C     1. How often do you have a drink containing alcohol? 0 Filed at: 06/19/2024 1742   2. How many drinks containing alcohol do you have on a typical day you are drinking?  0 Filed at: 06/19/2024 1742   3a. Male UNDER 65: How often do you have five or more drinks on one occasion? 0 Filed at: 06/19/2024 1742   3b. FEMALE Any Age, or MALE 65+: How often do you have 4 or more drinks on one occassion? 0 Filed at: 06/19/2024 1742   Audit-C Score 0 Filed at: 06/19/2024 1742   CARLOS: How many times in the past year have you...    Used an illegal drug or used a prescription medication for non-medical reasons? Never Filed at: 06/19/2024 1742                      Medical Decision Making  Patient complaints of excruciating back pain with a complaint of leg weakness prior to arrival. Considering patient's family history as  well as the current complaint, will evaluate with a dissection study as a precaution. Other differential diagnosis includes but is not limited to, PE, musculoskeletal spasm/strain versus sciatica. Presentation not consistent with malignancy (lack of history of malignancy, lack of B symptoms), fracture (no trauma, no bony tenderness to palpation), cauda equina (no bowel or urinary incontinence/retention, no saddle anesthesia, no distal weakness).  Thoroughly discussed imaging results with patient.  Will provide Toradol and lidocaine patch in the emergency department. Advised patient to follow-up with primary care for further management and discussion of incidental findings. Comprehensive spine program referral was completed. Return precautions provided to patient as well as written down in the AVS with full understanding. Patient discharged in no acute distress. Patient case was discussed with attending, Dr. Connor, who was agreeable to patient workup as well as patient disposition.    Amount and/or Complexity of Data Reviewed  Labs: ordered.  Radiology: ordered.    Risk  Prescription drug management.             Disposition  Final diagnoses:   Acute bilateral thoracic back pain     Time reflects when diagnosis was documented in both MDM as applicable and the Disposition within this note       Time User Action Codes Description Comment    6/19/2024 11:45 PM Latanya Russ Add [M54.6] Acute bilateral thoracic back pain           ED Disposition       ED Disposition   Discharge    Condition   Stable    Date/Time   Wed Jun 19, 2024 8811    Comment   Sekou Sigala discharge to home/self care.                   Follow-up Information       Follow up With Specialties Details Why Contact Info Additional Information     [facility] Emergency Department Emergency Medicine Go to  As needed, If symptoms worsen [address] [facility] [address] [phone] [facility]  Emergency Department, [address], [place] [address]    [employer]  Call  To establish primary care and discuss today's visit and results. [phone]       [facility] Comprehensive Spine Program Physical Therapy Schedule an appointment as soon as possible for a visit  As needed [phone]            There are no discharge medications for this patient.          PDMP Review       None            ED Provider  Electronically Signed by             Latanya Russ PA-C  06/20/24 0028

## 2024-08-09 ENCOUNTER — HOSPITAL ENCOUNTER (OUTPATIENT)
Dept: NON INVASIVE DIAGNOSTICS | Age: 24
Discharge: HOME | End: 2024-08-09